# Patient Record
Sex: MALE | Race: WHITE | NOT HISPANIC OR LATINO | Employment: FULL TIME | URBAN - METROPOLITAN AREA
[De-identification: names, ages, dates, MRNs, and addresses within clinical notes are randomized per-mention and may not be internally consistent; named-entity substitution may affect disease eponyms.]

---

## 2020-06-04 ENCOUNTER — OFFICE VISIT (OUTPATIENT)
Dept: PRIMARY CARE CLINIC | Facility: CLINIC | Age: 30
End: 2020-06-04
Payer: COMMERCIAL

## 2020-06-04 VITALS
RESPIRATION RATE: 18 BRPM | WEIGHT: 165.13 LBS | DIASTOLIC BLOOD PRESSURE: 60 MMHG | TEMPERATURE: 99 F | SYSTOLIC BLOOD PRESSURE: 119 MMHG | HEART RATE: 71 BPM | BODY MASS INDEX: 23.64 KG/M2 | OXYGEN SATURATION: 96 % | HEIGHT: 70 IN

## 2020-06-04 DIAGNOSIS — M54.42 CHRONIC MIDLINE LOW BACK PAIN WITH LEFT-SIDED SCIATICA: Primary | ICD-10-CM

## 2020-06-04 DIAGNOSIS — G89.29 CHRONIC MIDLINE LOW BACK PAIN WITH LEFT-SIDED SCIATICA: Primary | ICD-10-CM

## 2020-06-04 PROCEDURE — 99203 PR OFFICE/OUTPT VISIT, NEW, LEVL III, 30-44 MIN: ICD-10-PCS | Mod: S$GLB,,, | Performed by: INTERNAL MEDICINE

## 2020-06-04 PROCEDURE — 99999 PR PBB SHADOW E&M-NEW PATIENT-LVL IV: CPT | Mod: PBBFAC,,, | Performed by: INTERNAL MEDICINE

## 2020-06-04 PROCEDURE — 99999 PR PBB SHADOW E&M-NEW PATIENT-LVL IV: ICD-10-PCS | Mod: PBBFAC,,, | Performed by: INTERNAL MEDICINE

## 2020-06-04 PROCEDURE — 99203 OFFICE O/P NEW LOW 30 MIN: CPT | Mod: S$GLB,,, | Performed by: INTERNAL MEDICINE

## 2020-06-04 RX ORDER — GABAPENTIN 300 MG/1
300 CAPSULE ORAL NIGHTLY
Qty: 30 CAPSULE | Refills: 2 | Status: SHIPPED | OUTPATIENT
Start: 2020-06-04

## 2020-06-04 RX ORDER — METHYLPREDNISOLONE 4 MG/1
TABLET ORAL
Qty: 21 TABLET | Refills: 0 | Status: SHIPPED | OUTPATIENT
Start: 2020-06-04 | End: 2020-06-25

## 2020-06-04 NOTE — PROGRESS NOTES
"Subjective:       Patient ID: Abram Reardon is a 30 y.o. male.    Chief Complaint: Establish Care and Back Pain    He is new to the clinic. No prior PCP in recent years. Presents urgently (scheduled yesterday) for low back pain with sciatica. He is not interested in routine screening today. This problem is months in duration, since September 2019. May have started with an episode of heavy lifting, but he denies any traumatic injury. Low back pain is midline, radiating to left leg with a dull ache in posterior thigh, lateral calf. Had been using OTC NSAIDS with some relief, but pain is increasing and he hasn't been able to sleep for a few nights now. Evaluated by a chiropractor months ago, it was only back pain at that time - the sciatica began after being manipulated. X-rays at the chiropractor reportedly normal. His rest is disturbed and his activity is limited by this problem, he would like definitive treatment.     PMH: No chronic medical conditions.   PSH: Sinus surgery.   Social: Former tobacco use, social alcohol. , originally from [facility]. Living here with his wife who is a Nurse with [employer] [place]   NKDA.  Medication: only OTC NSAIDS.     Review of Systems   Constitutional: Negative for chills, diaphoresis, fever and unexpected weight change.   Respiratory: Negative for cough and shortness of breath.    Cardiovascular: Negative for chest pain and palpitations.   Gastrointestinal: Negative for abdominal pain, blood in stool, constipation, diarrhea, nausea and vomiting.        No bowel or bladder incontinence.   Genitourinary: Negative for difficulty urinating, frequency, hematuria and urgency.   Musculoskeletal: Negative for gait problem and neck pain.   Skin: Negative for color change and rash.   Neurological: Negative for weakness.       Objective:    /60, Pulse 71, Temp 99.4, O2 Sat 96%, Ht 5' 10", Wt 165 lbs, BMI=23.7  Physical Exam   Constitutional: He is oriented to person, place, and time. " He appears well-developed and well-nourished. No distress.   Ambulatory with normal gait. Lean body habitus.   Eyes: Conjunctivae and EOM are normal.   Neck: Normal range of motion. Neck supple.   Cardiovascular: Normal rate, regular rhythm and normal heart sounds.   Pulmonary/Chest: Effort normal and breath sounds normal. No respiratory distress. He has no wheezes. He has no rales.   Abdominal: Soft. Bowel sounds are normal. He exhibits no distension. There is no tenderness.   Musculoskeletal: Normal range of motion. He exhibits no edema, tenderness or deformity.   No point tenderness along the spine, no spinal deformity. No paraspinous muscle spasm.    Neurological: He is alert and oriented to person, place, and time. No cranial nerve deficit. He exhibits normal muscle tone. Coordination normal.   Positive straight leg raise on left vs muscle tightness.    Skin: Skin is warm and dry. No rash noted.   Psychiatric: He has a normal mood and affect. His behavior is normal.       Assessment:       1. Chronic midline low back pain with left-sided sciatica        Plan:       Chronic midline low back pain with left-sided sciatica - no alarming symptoms, imaging deferred at this time.  -     methylPREDNISolone (MEDROL DOSEPACK) 4 mg tablet; use as directed  Dispense: 21 tablet; Refill: 0  -     gabapentin (NEURONTIN) 300 MG capsule; Take 1 capsule (300 mg total) by mouth every evening. For nerve pain.  Dispense: 30 capsule; Refill: 2  -     Ambulatory referral/consult to [facility] Clinic; Future; Expected date: 06/11/2020

## 2020-06-09 ENCOUNTER — OFFICE VISIT (OUTPATIENT)
Dept: NEUROSURGERY | Facility: CLINIC | Age: 30
End: 2020-06-09
Payer: COMMERCIAL

## 2020-06-09 VITALS
HEIGHT: 70 IN | BODY MASS INDEX: 23.64 KG/M2 | HEART RATE: 68 BPM | DIASTOLIC BLOOD PRESSURE: 79 MMHG | WEIGHT: 165.13 LBS | TEMPERATURE: 98 F | SYSTOLIC BLOOD PRESSURE: 136 MMHG

## 2020-06-09 DIAGNOSIS — G89.29 CHRONIC MIDLINE LOW BACK PAIN WITH LEFT-SIDED SCIATICA: ICD-10-CM

## 2020-06-09 DIAGNOSIS — M54.42 CHRONIC MIDLINE LOW BACK PAIN WITH LEFT-SIDED SCIATICA: ICD-10-CM

## 2020-06-09 PROCEDURE — 99999 PR PBB SHADOW E&M-EST. PATIENT-LVL III: ICD-10-PCS | Mod: PBBFAC,,, | Performed by: NURSE PRACTITIONER

## 2020-06-09 PROCEDURE — 99204 PR OFFICE/OUTPT VISIT, NEW, LEVL IV, 45-59 MIN: ICD-10-PCS | Mod: S$GLB,,, | Performed by: NURSE PRACTITIONER

## 2020-06-09 PROCEDURE — 99204 OFFICE O/P NEW MOD 45 MIN: CPT | Mod: S$GLB,,, | Performed by: NURSE PRACTITIONER

## 2020-06-09 PROCEDURE — 99999 PR PBB SHADOW E&M-EST. PATIENT-LVL III: CPT | Mod: PBBFAC,,, | Performed by: NURSE PRACTITIONER

## 2020-06-09 NOTE — LETTER
June 9, 2020      Kailee Royal MD  [address] [facility] [address]           [facility]  [address] [place] [address]  Phone: [phone]  Fax: [phone]          Patient: Adin Reardon   MR Number: [id number]   YOB: 1990   Date of Visit: 6/9/2020       Dear Dr. Kailee Royal:    Thank you for referring Adin Reardon to me for evaluation. Attached you will find relevant portions of my assessment and plan of care.    If you have questions, please do not hesitate to call me. I look forward to following Adin Reardon along with you.    Sincerely,    Judy Gupta, NP    Enclosure  CC:  No Recipients    If you would like to receive this communication electronically, please contact [email or web address] or [phone] to request more information on [facility] access.    For providers and/or their staff who would like to refer a patient to [facility], please contact us through our one-stop-shop provider referral line, Shanna Brice, at [phone].    If you feel you have received this communication in error or would no longer like to receive these types of communications, please e-mail [email or web address]

## 2020-06-09 NOTE — PROGRESS NOTES
Neurosurgery History & Physical    Patient ID: Adin Reardon is a 30 y.o. male.    Chief Complaint   Patient presents with    Lumbar Spine Pain (L-Spine)       History of Present Illness:   Patient is an 29-year-old  male who  presents today for evaluation of low back pain.  He reports that low back pain began approximately 1 year ago moving.  It lasted a few months but eventually subsided with rest.  In September of 2019 he noticed that his back pain began again with radiation to posterolateral aspect of left lower extremity and down into his 5th toe.   He reports that pain in low back has subsided and left lower extremity pain remains.  He denies right lower extremity symptoms, numbness/tingling, bladder/bowel dysfunction.  He has never been evaluated by pain management. He denies gait instability, however, he notices that he favors the right side d/t pain. He reports that the LLE pain does interfere with his daily activities. He is unable to be active with running, swimming as he was able to prior to September 2019.  Since then he has seen a chiropractor for adjustments with minimal relief.  XRays were performed during chiropractic evaluation but we do not have these records.    He participated in physical therapy several months ago with no improvement.  He reports that his left lower extremity pain is greater than his back pain this time.       He was recently evaluated by his PCP who rx'd a medrol dose skyla and gabapentin. Pt reports that there has been no change in symptoms since starting.    Review of Systems   Constitutional: Negative for activity change, appetite change and fatigue.   HENT: Negative for dental problem and hearing loss.    Eyes: Negative for photophobia and visual disturbance.   Respiratory: Negative for cough and shortness of breath.    Cardiovascular: Negative for leg swelling.   Gastrointestinal: Negative for nausea and vomiting.   Endocrine: Negative for cold intolerance and  heat intolerance.   Genitourinary: Negative for decreased urine volume, difficulty urinating and urgency.   Musculoskeletal: Positive for back pain, gait problem and myalgias. Negative for arthralgias and neck pain.   Skin: Negative for wound.   Allergic/Immunologic: Negative for immunocompromised state.   Neurological: Negative for dizziness, weakness, numbness and headaches.   Psychiatric/Behavioral: Negative for agitation. The patient is not nervous/anxious.        No past medical history on file.  Social History     Socioeconomic History    Marital status:      Spouse name: Not on file    Number of children: Not on file    Years of education: Not on file    Highest education level: Not on file   Occupational History    Not on file   Social Needs    Financial resource strain: Not on file    Food insecurity:     Worry: Not on file     Inability: Not on file    Transportation needs:     Medical: Not on file     Non-medical: Not on file   Tobacco Use    Smoking status: Current Some Day Smoker    Smokeless tobacco: Never Used   Substance and Sexual Activity    Alcohol use: Not on file    Drug use: Not on file    Sexual activity: Not on file   Lifestyle    Physical activity:     Days per week: Not on file     Minutes per session: Not on file    Stress: Not on file   Relationships    Social connections:     Talks on phone: Not on file     Gets together: Not on file     Attends Mandaeism service: Not on file     Active member of club or organization: Not on file     Attends meetings of clubs or organizations: Not on file     Relationship status: Not on file   Other Topics Concern    Not on file   Social History Narrative    Not on file     No family history on file.  Review of patient's allergies indicates:  No Known Allergies    Current Outpatient Medications:     gabapentin (NEURONTIN) 300 MG capsule, Take 1 capsule (300 mg total) by mouth every evening. For nerve pain., Disp: 30 capsule,  "Rfl: 2    methylPREDNISolone (MEDROL DOSEPACK) 4 mg tablet, use as directed, Disp: 21 tablet, Rfl: 0  Blood pressure 136/79, pulse 68, temperature 98.2 °F (36.8 °C), height 5' 10" (1.778 m), weight 74.9 kg (165 lb 2 oz).      Neurologic Exam     Mental Status   Oriented to person, place, and time.   Level of consciousness: alert    Cranial Nerves   Cranial nerves II through XII intact.     Motor Exam   Muscle bulk: normal  Overall muscle tone: normal  Right arm tone: normal  Left arm tone: normal  Right arm pronator drift: absent  Left arm pronator drift: absent  Right leg tone: normal  Left leg tone: normal    Strength   Strength 5/5 except as noted. Left knee flexion 4/5, limited by pain.      Sensory Exam   Light touch normal.     Gait, Coordination, and Reflexes     Gait  Gait: normal    Coordination   Romberg: negative  Finger to nose coordination: normal  Tandem walking coordination: normal    Reflexes   Right brachioradialis: 2+  Left brachioradialis: 2+  Right biceps: 2+  Left biceps: 2+  Right patellar: 1+  Left patellar: 1+  Right achilles: 1+  Left achilles: 1+  Right Niño: absent  Left Niño: absent  Right ankle clonus: absent  Left ankle clonus: absent      Physical Exam   Constitutional: He is oriented to person, place, and time. He appears well-developed and well-nourished.   HENT:   Head: Normocephalic and atraumatic.   Eyes: Conjunctivae are normal.   Neck: Normal range of motion.   Abdominal: There is no guarding.   Musculoskeletal: Normal range of motion.   Neurological: He is alert and oriented to person, place, and time. He has a normal Finger-Nose-Finger Test, a normal Romberg Test and a normal Tandem Gait Test. Gait normal.   Reflex Scores:       Bicep reflexes are 2+ on the right side and 2+ on the left side.       Brachioradialis reflexes are 2+ on the right side and 2+ on the left side.       Patellar reflexes are 1+ on the right side and 1+ on the left side.       Achilles reflexes " are 1+ on the right side and 1+ on the left side.  Skin: Skin is warm and dry.   Psychiatric: He has a normal mood and affect.       Oswestry: 36%  PHQ: 6    Imaging:   No imaging for review.     Assessment & Plan:   1. Chronic midline low back pain with left-sided sciatica  Ambulatory referral/consult to Back [facility] Spine Clinic    MRI Lumbar Spine Without Contrast    X-Ray Lumbar Spine Ap Lateral w/Flex Ext       The patient is a 30 year old  male who presents today for evaluation of chronic low back pain. I have discussed exam findings with the patient and his wife. His symptomology appears radicular in nature. I will order an MRI and XR of the lumbar spine to further evaluate for compressive pathology. They are agreeable to the plan. I will call them with results and further treatment plan. They will call our office with any questions or concerns in the interim .

## 2020-06-22 ENCOUNTER — HOSPITAL ENCOUNTER (OUTPATIENT)
Dept: RADIOLOGY | Facility: HOSPITAL | Age: 30
Discharge: HOME | End: 2020-06-22
Attending: NURSE PRACTITIONER
Payer: COMMERCIAL

## 2020-06-22 DIAGNOSIS — G89.29 CHRONIC MIDLINE LOW BACK PAIN WITH LEFT-SIDED SCIATICA: ICD-10-CM

## 2020-06-22 DIAGNOSIS — M54.42 CHRONIC MIDLINE LOW BACK PAIN WITH LEFT-SIDED SCIATICA: ICD-10-CM

## 2020-06-22 PROCEDURE — 72100 X-RAY EXAM L-S SPINE 2/3 VWS: CPT | Mod: 26,,, | Performed by: RADIOLOGY

## 2020-06-22 PROCEDURE — 72120 X-RAY BEND ONLY L-S SPINE: CPT | Mod: 26,,, | Performed by: RADIOLOGY

## 2020-06-22 PROCEDURE — 72100 XR LUMBAR SPINE AP AND LAT WITH FLEX/EXT: ICD-10-PCS | Mod: 26,,, | Performed by: RADIOLOGY

## 2020-06-22 PROCEDURE — 72148 MRI LUMBAR SPINE W/O DYE: CPT | Mod: TC,PO

## 2020-06-22 PROCEDURE — 72148 MRI LUMBAR SPINE W/O DYE: CPT | Mod: 26,,, | Performed by: RADIOLOGY

## 2020-06-22 PROCEDURE — 72120 X-RAY BEND ONLY L-S SPINE: CPT | Mod: TC,FY,PO

## 2020-06-22 PROCEDURE — 72120 XR LUMBAR SPINE AP AND LAT WITH FLEX/EXT: ICD-10-PCS | Mod: 26,,, | Performed by: RADIOLOGY

## 2020-06-22 PROCEDURE — 72148 MRI LUMBAR SPINE WITHOUT CONTRAST: ICD-10-PCS | Mod: 26,,, | Performed by: RADIOLOGY

## 2020-06-29 ENCOUNTER — TELEPHONE (OUTPATIENT)
Dept: NEUROSURGERY | Facility: CLINIC | Age: 30
End: 2020-06-29

## 2020-06-29 NOTE — TELEPHONE ENCOUNTER
Spoke with patient regarding MRI/XR results. I have discussed options with continued conservative management vs surgical intervention-- Left L5/S1 microdiscectomy. He would like to think about options and will call our office if he would like to make an appt with Dr. Gamble to discuss surgery.

## 2020-07-08 ENCOUNTER — OFFICE VISIT (OUTPATIENT)
Dept: NEUROSURGERY | Facility: CLINIC | Age: 30
End: 2020-07-08
Payer: COMMERCIAL

## 2020-07-08 VITALS
SYSTOLIC BLOOD PRESSURE: 109 MMHG | BODY MASS INDEX: 23.6 KG/M2 | TEMPERATURE: 98 F | DIASTOLIC BLOOD PRESSURE: 62 MMHG | HEIGHT: 70 IN | HEART RATE: 70 BPM | WEIGHT: 164.81 LBS

## 2020-07-08 DIAGNOSIS — G89.29 CHRONIC MIDLINE LOW BACK PAIN WITH LEFT-SIDED SCIATICA: Primary | ICD-10-CM

## 2020-07-08 DIAGNOSIS — M54.42 CHRONIC MIDLINE LOW BACK PAIN WITH LEFT-SIDED SCIATICA: Primary | ICD-10-CM

## 2020-07-08 PROCEDURE — 99999 PR PBB SHADOW E&M-EST. PATIENT-LVL III: ICD-10-PCS | Mod: PBBFAC,,, | Performed by: NEUROLOGICAL SURGERY

## 2020-07-08 PROCEDURE — 99214 OFFICE O/P EST MOD 30 MIN: CPT | Mod: S$GLB,,, | Performed by: NEUROLOGICAL SURGERY

## 2020-07-08 PROCEDURE — 99999 PR PBB SHADOW E&M-EST. PATIENT-LVL III: CPT | Mod: PBBFAC,,, | Performed by: NEUROLOGICAL SURGERY

## 2020-07-08 PROCEDURE — 99214 PR OFFICE/OUTPT VISIT, EST, LEVL IV, 30-39 MIN: ICD-10-PCS | Mod: S$GLB,,, | Performed by: NEUROLOGICAL SURGERY

## 2020-07-14 NOTE — PROGRESS NOTES
Neurosurgery History & Physical    Patient ID: Adin Reardon is a 30 y.o. male.    Chief Complaint   Patient presents with    Lumbar Spine Pain (L-Spine)     chronic low back pain here to discuss surgery L5-S1 left microdiscectomy, no new symptoms but current symptoms are still present with no relief, pain today 8/10       HPI:  Mr. Reardon is a 30-year-old  male who was recently evaluated in neurosurgery clinic for low back pain with radiation down the LLE.  At that time:      He reports that low back pain began approximately 1 year ago moving.  It lasted a few months but eventually subsided with rest.  In September of 2019 he noticed that his back pain began again with radiation to posterolateral aspect of left lower extremity and down into his 5th toe.   He reports that pain in low back has subsided and left lower extremity pain remains.  He denies right lower extremity symptoms, numbness/tingling, bladder/bowel dysfunction.  He has never been evaluated by pain management. He denies gait instability, however, he notices that he favors the right side d/t pain. He reports that the LLE pain does interfere with his daily activities. He is unable to be active with running, swimming as he was able to prior to September 2019.  Since then he has seen a chiropractor for adjustments with minimal relief.  XRays were performed during chiropractic evaluation but we do not have these records.    He participated in physical therapy several months ago with no improvement.  He reports that his left lower extremity pain is greater than his back pain this time.        He was recently evaluated by his PCP who rx'd a medrol dose skyla and gabapentin. Pt reports that there has been no change in symptoms since starting.    He was then sent for MRI lumbar spine which revealed a left L5-S1 herniated disc.  He now returns to clinic to discuss this pathology and options going forward.         Review of Systems   Constitutional:  Negative for activity change and fever.   HENT: Negative for rhinorrhea, tinnitus, trouble swallowing and voice change.    Eyes: Negative for visual disturbance.   Respiratory: Negative for shortness of breath.    Cardiovascular: Negative for chest pain.   Gastrointestinal: Negative for nausea and vomiting.   Endocrine: Negative for cold intolerance, heat intolerance, polydipsia, polyphagia and polyuria.   Genitourinary: Negative for decreased urine volume, frequency and urgency.   Musculoskeletal: Negative for back pain, neck pain and neck stiffness.   Neurological: Positive for weakness and numbness. Negative for dizziness, tremors, seizures, syncope, facial asymmetry, speech difficulty, light-headedness and headaches.   Psychiatric/Behavioral: Negative for confusion.       No past medical history on file.  Social History     Socioeconomic History    Marital status:      Spouse name: Not on file    Number of children: Not on file    Years of education: Not on file    Highest education level: Not on file   Occupational History    Not on file   Social Needs    Financial resource strain: Not on file    Food insecurity     Worry: Not on file     Inability: Not on file    Transportation needs     Medical: Not on file     Non-medical: Not on file   Tobacco Use    Smoking status: Current Some Day Smoker    Smokeless tobacco: Never Used   Substance and Sexual Activity    Alcohol use: Not on file    Drug use: Not on file    Sexual activity: Not on file   Lifestyle    Physical activity     Days per week: Not on file     Minutes per session: Not on file    Stress: Not on file   Relationships    Social connections     Talks on phone: Not on file     Gets together: Not on file     Attends Episcopal service: Not on file     Active member of club or organization: Not on file     Attends meetings of clubs or organizations: Not on file     Relationship status: Not on file   Other Topics Concern    Not on  "file   Social History Narrative    Not on file     No family history on file.  Review of patient's allergies indicates:  No Known Allergies    Current Outpatient Medications:     gabapentin (NEURONTIN) 300 MG capsule, Take 1 capsule (300 mg total) by mouth every evening. For nerve pain. (Patient not taking: Reported on 7/8/2020), Disp: 30 capsule, Rfl: 2  Blood pressure 109/62, pulse 70, temperature 98.2 °F (36.8 °C), height 5' 10" (1.778 m), weight 74.7 kg (164 lb 12.7 oz).      Neurologic Exam     Mental Status   Oriented to person, place, and time.   Attention: normal.   Speech: speech is normal   Level of consciousness: alert  Knowledge: good.     Cranial Nerves     CN III, IV, VI   Pupils are equal, round, and reactive to light.  Extraocular motions are normal.     CN VII   Facial expression full, symmetric.     CN VIII   CN VIII normal.     Motor Exam   Muscle bulk: normal  Overall muscle tone: normal    Strength   Strength 5/5 throughout.     Sensory Exam   Light touch normal.     Gait, Coordination, and Reflexes     Gait  Gait: normal    Coordination   Romberg: negative      Physical Exam  Eyes:      Extraocular Movements: EOM normal.      Pupils: Pupils are equal, round, and reactive to light.   Neurological:      Mental Status: He is oriented to person, place, and time.      Coordination: Romberg Test normal.      Gait: Gait is intact.      Deep Tendon Reflexes: Strength normal.   Psychiatric:         Speech: Speech normal.         Imaging:  MRI of the lumbar spine dated 06/22/2020 is personally reviewed and discussed with the patient.  There is an L5-S1 disc herniation on the left side with compression of the traveling S1 nerve root.    Assessment/Plan:   Mr. Barlow is a 30 year old male with a left-sided L5-S1 herniated disc.  I discussed with him treatment for herniated discs including, primarily, conservative therapy with pain medications, epidural steroid injections, and physical therapy.  I also " discussed, if these fail, surgical intervention which involves a microdiscectomy at the affected level.  At this time he would like to continue with conservative therapy.  I discussed the technique for L5-S1 microdiskectomy in detail.  We will place a repeat consult physical therapy.  He will check in with us in approximately 1 month.  If he has any worsening of his pain were strength or if he wishes to proceed with microdiskectomy he will call in sooner.    Total visit time 45 min with greater than 50% in face-to-face counseling.

## 2020-08-12 ENCOUNTER — TELEPHONE (OUTPATIENT)
Dept: NEUROSURGERY | Facility: CLINIC | Age: 30
End: 2020-08-12

## 2020-08-12 NOTE — TELEPHONE ENCOUNTER
Attempted to reach pt to discuss scheduling second opinion consult for disc herniation.  Unable to LM, VM is not set up.  ----- Message from Ondina Porter MA sent at 8/11/2020 12:08 PM CDT -----  Regarding: FW: new patient second opinion    ----- Message -----  From: Mary Kate Keene  Sent: 8/11/2020  11:45 AM CDT  To: [facility] Staff  Subject: new patient second opinion                       Name of Who is Calling: JORDEN CAREY [[id number]]      What is the request in detail: Patient is requesting a call back he would like to schedule an appointment for a second opinion he states he has a L5S1      Can the clinic reply by [facility]: no      What Number to Call Back if not in [facility]:[phone]

## 2020-08-16 ENCOUNTER — TELEPHONE (OUTPATIENT)
Dept: NEUROSURGERY | Facility: CLINIC | Age: 30
End: 2020-08-16

## 2020-08-16 NOTE — TELEPHONE ENCOUNTER
Pt confirmed his consult w/Dr. Chapman scheduled for 10.08.2020.  Placed on [facility] and aware we will contact him if something comes available sooner, V/U.

## 2021-07-01 ENCOUNTER — PATIENT MESSAGE (OUTPATIENT)
Dept: ADMINISTRATIVE | Facility: OTHER | Age: 31
End: 2021-07-01

## 2021-07-06 ENCOUNTER — PATIENT MESSAGE (OUTPATIENT)
Dept: ADMINISTRATIVE | Facility: HOSPITAL | Age: 31
End: 2021-07-06

## 2021-10-05 ENCOUNTER — PATIENT MESSAGE (OUTPATIENT)
Dept: ADMINISTRATIVE | Facility: HOSPITAL | Age: 31
End: 2021-10-05

## 2022-01-18 ENCOUNTER — PATIENT MESSAGE (OUTPATIENT)
Dept: ADMINISTRATIVE | Facility: HOSPITAL | Age: 32
End: 2022-01-18
Payer: COMMERCIAL